# Patient Record
Sex: MALE | URBAN - METROPOLITAN AREA
[De-identification: names, ages, dates, MRNs, and addresses within clinical notes are randomized per-mention and may not be internally consistent; named-entity substitution may affect disease eponyms.]

---

## 2023-06-15 ENCOUNTER — APPOINTMENT (RX ONLY)
Dept: URBAN - METROPOLITAN AREA CLINIC 89 | Facility: CLINIC | Age: 28
Setting detail: DERMATOLOGY
End: 2023-06-15

## 2023-06-15 DIAGNOSIS — D49.2 NEOPLASM OF UNSPECIFIED BEHAVIOR OF BONE, SOFT TISSUE, AND SKIN: ICD-10-CM

## 2023-06-15 DIAGNOSIS — D22 MELANOCYTIC NEVI: ICD-10-CM

## 2023-06-15 DIAGNOSIS — Q82.3 INCONTINENTIA PIGMENTI: ICD-10-CM

## 2023-06-15 DIAGNOSIS — Z85.828 PERSONAL HISTORY OF OTHER MALIGNANT NEOPLASM OF SKIN: ICD-10-CM

## 2023-06-15 PROBLEM — D22.5 MELANOCYTIC NEVI OF TRUNK: Status: ACTIVE | Noted: 2023-06-15

## 2023-06-15 PROBLEM — C44.92 SQUAMOUS CELL CARCINOMA OF SKIN, UNSPECIFIED: Status: ACTIVE | Noted: 2023-06-15

## 2023-06-15 PROCEDURE — 11104 PUNCH BX SKIN SINGLE LESION: CPT

## 2023-06-15 PROCEDURE — ? BIOPSY BY PUNCH METHOD

## 2023-06-15 PROCEDURE — ? COUNSELING

## 2023-06-15 PROCEDURE — 99203 OFFICE O/P NEW LOW 30 MIN: CPT | Mod: 25

## 2023-06-15 PROCEDURE — ? DIAGNOSIS COMMENT

## 2023-06-15 PROCEDURE — ? ADDITIONAL NOTES

## 2023-06-15 ASSESSMENT — LOCATION SIMPLE DESCRIPTION DERM
LOCATION SIMPLE: LEFT POPLITEAL SKIN
LOCATION SIMPLE: RIGHT UPPER BACK

## 2023-06-15 ASSESSMENT — LOCATION ZONE DERM
LOCATION ZONE: TRUNK
LOCATION ZONE: LEG

## 2023-06-15 ASSESSMENT — LOCATION DETAILED DESCRIPTION DERM
LOCATION DETAILED: LEFT POPLITEAL SKIN
LOCATION DETAILED: RIGHT INFERIOR UPPER BACK

## 2023-06-15 NOTE — HPI: SKIN CANCER
Has Your Skin Cancer Been Treated?: not been treated
Is This A New Presentation, Or A Follow-Up?: Skin Cancer
Additional History: Patient recently moved from [place] where had two biopsies performed on left leg by Dr. Rut[facility]allings[id number]23 A) Left distal calf (shave) Invasive SCC B) Left posterior ankle (shave) Invasive SCC.

## 2023-06-15 NOTE — PROCEDURE: BIOPSY BY PUNCH METHOD

## 2023-06-15 NOTE — PROCEDURE: ADDITIONAL NOTES
Detail Level: Simple
Additional Notes: Path report scanned into [facility]\\nPhoto taken of  previous biopsy sites identified by pt\\nDiscussed options including RT and Mohs - refer for Mohs- closure to be a challenge given tumor size and location
Render Risk Assessment In Note?: no
Additional Notes: Pt with hx IP and now three large SCC's- will perform literature search to see if pt  population at risk for skin CA as in other genodermatoses such as Gorlins Syndrome

## 2023-06-15 NOTE — PROCEDURE: DIAGNOSIS COMMENT
Comment: Left knee - SCC - excision  with failed graft  03/2033
Detail Level: Simple
Render Risk Assessment In Note?: yes
Comment: Left distal calf - 05/26/2023\\nLeft posterior ankle - 05/26/2023

## 2023-06-29 ENCOUNTER — APPOINTMENT (RX ONLY)
Dept: URBAN - METROPOLITAN AREA CLINIC 89 | Facility: CLINIC | Age: 28
Setting detail: DERMATOLOGY
End: 2023-06-29

## 2023-06-29 DIAGNOSIS — Q82.3 INCONTINENTIA PIGMENTI: ICD-10-CM

## 2023-06-29 DIAGNOSIS — L44.8 OTHER SPECIFIED PAPULOSQUAMOUS DISORDERS: ICD-10-CM

## 2023-06-29 DIAGNOSIS — Z85.828 PERSONAL HISTORY OF OTHER MALIGNANT NEOPLASM OF SKIN: ICD-10-CM

## 2023-06-29 PROBLEM — C44.92 SQUAMOUS CELL CARCINOMA OF SKIN, UNSPECIFIED: Status: ACTIVE | Noted: 2023-06-29

## 2023-06-29 PROCEDURE — ? ADDITIONAL NOTES

## 2023-06-29 PROCEDURE — 99213 OFFICE O/P EST LOW 20 MIN: CPT

## 2023-06-29 PROCEDURE — ? COUNSELING

## 2023-06-29 PROCEDURE — ? DIAGNOSIS COMMENT

## 2023-06-29 ASSESSMENT — LOCATION SIMPLE DESCRIPTION DERM: LOCATION SIMPLE: LEFT POPLITEAL SKIN

## 2023-06-29 ASSESSMENT — LOCATION DETAILED DESCRIPTION DERM: LOCATION DETAILED: LEFT POPLITEAL SKIN

## 2023-06-29 ASSESSMENT — LOCATION ZONE DERM: LOCATION ZONE: LEG

## 2023-06-29 NOTE — PROCEDURE: DIAGNOSIS COMMENT
Comment: Left distal calf - 05/26/2023\\nLeft posterior ankle - 05/26/2023
Detail Level: Simple
Render Risk Assessment In Note?: yes
Comment: Left knee - SCC - excision  with failed graft  03/2033
Comment: Bx proven - [facility]#: [id number] - 06/15/2023

## 2023-06-29 NOTE — PROCEDURE: ADDITIONAL NOTES
Detail Level: Simple
Render Risk Assessment In Note?: no
Additional Notes: Literature reviewed and multiple case reports of SCC in IP patients- pt at risk for additional SCC's\\nStrict sun avoidance\\nRecommended Nicotinamide 500mg bid
Additional Notes: Pt to have excisions performed at [facility] and have ongoing care there including skin checks which he has scheduled